# Patient Record
Sex: FEMALE
[De-identification: names, ages, dates, MRNs, and addresses within clinical notes are randomized per-mention and may not be internally consistent; named-entity substitution may affect disease eponyms.]

---

## 2019-04-16 ENCOUNTER — HOSPITAL ENCOUNTER (EMERGENCY)
Dept: HOSPITAL 25 - UCEAST | Age: 39
Discharge: HOME | End: 2019-04-16
Payer: SELF-PAY

## 2019-04-16 VITALS — DIASTOLIC BLOOD PRESSURE: 65 MMHG | SYSTOLIC BLOOD PRESSURE: 99 MMHG

## 2019-04-16 DIAGNOSIS — K59.00: Primary | ICD-10-CM

## 2019-04-16 PROCEDURE — G0463 HOSPITAL OUTPT CLINIC VISIT: HCPCS

## 2019-04-16 PROCEDURE — 74019 RADEX ABDOMEN 2 VIEWS: CPT

## 2019-04-16 PROCEDURE — 99202 OFFICE O/P NEW SF 15 MIN: CPT

## 2019-04-16 NOTE — UC
Abdominal Pain Female HPI





- HPI Summary


HPI Summary: 





39 yo female presents with constipation for roughly one year. She tells me that 

over the last year she has had issues with hard and small BMs and feeling 

bloated. She has a BM every 3-4 days, but never feels that she is satisfied 

with her bowel movements. Today she tells me that she has not had a BM in 5 

days. Last night her abdomen felt very bloating and uncomfortable so she took 

ducolax and did a fleet enema, but admits that the enema hurt so she is not 

sure how properly she administered this. She has never had any abdominal 

surgeries. No change in diet and is eating/drinking well. No blood in stool. 

Has never seen GI. 





- History of Current Complaint


Chief Complaint: UCGI


Stated Complaint: CONSTIPATION


Time Seen by Provider: 04/16/19 16:33


Hx Obtained From: Patient


Hx Last Menstrual Period: 4/10/2018


Onset/Duration: Gradual Onset


Severity Initially: Mild


Severity Currently: Mild


Pain Intensity: 3


Pain Scale Used: 0-10 Numeric


Allergies/Adverse Reactions: 


 Allergies











Allergy/AdvReac Type Severity Reaction Status Date / Time


 


No Known Allergies Allergy   Verified 04/16/19 16:17











Home Medications: 


 Home Medications





Irritable Bowel Homeopathic Ta 1 tab PO DAILY PRN 04/16/19 [History]


L.acidoph,Paracasei, B.lactis [Probiotic] 1 each PO DAILY 04/16/19 [History 

Confirmed 04/16/19]


Magnesium Hydroxide LIQ* [Milk of Magnesia LIQ*] 30 ml PO BID PRN 04/16/19 [

History Confirmed 04/16/19]


Sodium Phosphate ADULT ENEMA* [Fleet Enema*] 1 enema WA DAILY PRN 04/16/19 [

History Confirmed 04/16/19]











PMH/Surg Hx/FS Hx/Imm Hx





- Additional Past Medical History


Additional PMH: 





None





- Surgical History


Surgical History: None





- Family History


Known Family History: Positive: None





- Social History


Lives: With Family


Alcohol Use: None


Substance Use Type: None


Smoking Status (MU): Never Smoked Tobacco





Review of Systems


All Other Systems Reviewed And Are Negative: Yes


Constitutional: Positive: Negative


Skin: Positive: Negative


Respiratory: Positive: Negative


Cardiovascular: Positive: Negative


Gastrointestinal: Positive: Abdominal Pain, Other - Constipation


Genitourinary: Positive: Negative


Neurological: Positive: Negative


Psychological: Positive: Negative





Physical Exam





- Summary


Physical Exam Summary: 





 


GENERAL: NAD. WDWN. No pain distress.


SKIN: No rashes, sores, lesions, or open wounds.


NECK: Supple. Nontender. No lymphadenopathy. 


CHEST:  CTAB. No r/r/w. No accessory muscle use. Breathing comfortably and in 

no distress.


CV:  RRR. Without m/r/g. Pulses intact. Cap refill <2seconds


ABDOMEN:  Soft. NTTP. No distention or guarding. No CVA tenderness. Bowel 

sounds present


NEURO: Alert. 


PSYCH: Age appropriate behavior.


Triage Information Reviewed: Yes


Vital Signs: 


 Initial Vital Signs











Temp  98.4 F   04/16/19 16:20


 


Pulse  84   04/16/19 16:20


 


Resp  18   04/16/19 16:20


 


BP  99/65   04/16/19 16:20


 


Pulse Ox  99   04/16/19 16:20











Vital Signs Reviewed: Yes





Abd Pain Female Course/Dx





- Course


Course Of Treatment: 





Abdomen XR: 


IMPRESSION:


#. No abdominal pelvic pathologic process evident.





Suspect chronic constipation. Advised her to try a high fiber diet and will rx 

for miralax daily and, for now, will rx for mag citrate. Highly recommended f/u 

with GI for further evaluation/treatment.





- Differential Dx/Diagnosis


Provider Diagnosis: 


 Constipation








Discharge





- Sign-Out/Discharge


Documenting (check all that apply): Patient Departure


All imaging exams completed and their final reports reviewed: No Studies





- Discharge Plan


Condition: Stable


Disposition: HOME


Prescriptions: 


Magnesium CITRATE* [Citrate of Magnesia*] 300 ml PO ONCE #1 btl


Polyethylene Glycol 3350 [Miralax] 17 gm PO DAILY #30 powd.[email or web address]


Patient Education Materials:  Constipation (ED)


Referrals: 


Seven Cordon MD [Medical Doctor] - As Soon As Possible


No Primary Care Phys,NOPCP [Primary Care Provider] - 


Additional Instructions: 


If you develop a fever, shortness of breath, chest pain, new or worsening 

symptoms - please call your PCP or go to the ED.


 


I highly recommend you schedule an appointment with GI at the number below for 

further evaluation of your constipation.





Please take the miralax daily to help keep you from getting constipated.





- Billing Disposition and Condition


Condition: STABLE


Disposition: Home

## 2019-10-03 ENCOUNTER — HOSPITAL ENCOUNTER (EMERGENCY)
Dept: HOSPITAL 25 - ED | Age: 39
LOS: 1 days | Discharge: HOME | End: 2019-10-04
Payer: SELF-PAY

## 2019-10-03 DIAGNOSIS — R05: ICD-10-CM

## 2019-10-03 DIAGNOSIS — R06.02: ICD-10-CM

## 2019-10-03 DIAGNOSIS — R42: ICD-10-CM

## 2019-10-03 DIAGNOSIS — R20.2: ICD-10-CM

## 2019-10-03 DIAGNOSIS — R10.9: ICD-10-CM

## 2019-10-03 DIAGNOSIS — R07.89: ICD-10-CM

## 2019-10-03 DIAGNOSIS — R51: Primary | ICD-10-CM

## 2019-10-03 LAB
BASOPHILS # BLD AUTO: 0.1 10^3/UL (ref 0–0.2)
EOSINOPHIL # BLD AUTO: 0.2 10^3/UL (ref 0–0.6)
HCT VFR BLD AUTO: 38 % (ref 35–47)
HGB BLD-MCNC: 12.9 G/DL (ref 12–16)
LYMPHOCYTES # BLD AUTO: 1.5 10^3/UL (ref 1–4.8)
MCH RBC QN AUTO: 32 PG (ref 27–31)
MCHC RBC AUTO-ENTMCNC: 34 G/DL (ref 31–36)
MCV RBC AUTO: 94 FL (ref 80–97)
MONOCYTES # BLD AUTO: 1.1 10^3/UL (ref 0–0.8)
NEUTROPHILS # BLD AUTO: 11.4 10^3/UL (ref 1.5–7.7)
NRBC # BLD AUTO: 0 10^3/UL
NRBC BLD QL AUTO: 0
PLATELET # BLD AUTO: 794 10^3/UL (ref 150–450)
RBC # BLD AUTO: 4 10^6 /UL (ref 3.7–4.87)
WBC # BLD AUTO: 14.3 10^3/UL (ref 3.5–10.8)

## 2019-10-03 PROCEDURE — 99282 EMERGENCY DEPT VISIT SF MDM: CPT

## 2019-10-03 PROCEDURE — 85025 COMPLETE CBC W/AUTO DIFF WBC: CPT

## 2019-10-03 PROCEDURE — 84443 ASSAY THYROID STIM HORMONE: CPT

## 2019-10-03 PROCEDURE — 80053 COMPREHEN METABOLIC PANEL: CPT

## 2019-10-03 PROCEDURE — 96372 THER/PROPH/DIAG INJ SC/IM: CPT

## 2019-10-03 PROCEDURE — 71046 X-RAY EXAM CHEST 2 VIEWS: CPT

## 2019-10-03 PROCEDURE — 84484 ASSAY OF TROPONIN QUANT: CPT

## 2019-10-03 PROCEDURE — 83735 ASSAY OF MAGNESIUM: CPT

## 2019-10-03 PROCEDURE — 36415 COLL VENOUS BLD VENIPUNCTURE: CPT

## 2019-10-03 PROCEDURE — 83605 ASSAY OF LACTIC ACID: CPT

## 2019-10-03 PROCEDURE — 85379 FIBRIN DEGRADATION QUANT: CPT

## 2019-10-03 PROCEDURE — 93005 ELECTROCARDIOGRAM TRACING: CPT

## 2019-10-03 PROCEDURE — 84702 CHORIONIC GONADOTROPIN TEST: CPT

## 2019-10-03 PROCEDURE — 70450 CT HEAD/BRAIN W/O DYE: CPT

## 2019-10-03 NOTE — ED
Complex/Multi-Sys Presentation





- HPI Summary


HPI Summary: 


38 year old female presents with headache today.  She states she was eating 

dinner when she developed a severe headache in the posterior aspect of her 

head.  States the pain is dissipating.  It was 10 out of 10 pain.  States it 

only happened about an hour ago.  She denies any nausea vomiting.  States her 

visual blurry for a couple seconds.  She admits to dizziness.  She also admits 

to chest pain and shortness breath.  She just had a cough.  She also had 

abdominal pain.  No urinary symptoms.  She states she felt very anxious.  She 

felt numbness and tingling across body.  No weakness.  Was ambulating to try to 

deal with the shortness of breath and said it helped.  Currently headache is 

only 6 out of 10.  Has some slight shortness of breath. She has no medical 

conditions.  No family history cardiac disease.  Is nonsmoker.  








- History Of Current Complaint


Chief Complaint: EDDizziness


Time Seen by Provider: 10/03/19 23:15





- Allergies/Home Medications


Allergies/Adverse Reactions: 


 Allergies











Allergy/AdvReac Type Severity Reaction Status Date / Time


 


No Known Allergies Allergy   Verified 10/03/19 22:39











Home Medications: 


 Home Medications





NK [No Home Medications Reported]  10/04/19 [History Confirmed 10/04/19]











PMH/Surg Hx/FS Hx/Imm Hx


Endocrine/Hematology History: 


   Denies: Hx Anticoagulant Therapy


Respiratory History: 


   Denies: Hx Asthma


Infectious Disease History: No


Infectious Disease History: 


   Denies: Traveled Outside the [place] in Last 30 Days





- Family History


Known Family History: Positive: None


   Negative: Cardiac Disease





- Social History


Alcohol Use: Occasionally


Substance Use Type: Reports: None


Smoking Status (MU): Never Smoked Tobacco





Review of Systems


Negative: Fever


Positive: Chest Pain


Positive: Shortness Of Breath, Cough


Negative: Vomiting, Nausea


Positive: Headache


All Other Systems Reviewed And Are Negative: Yes





Physical Exam


Triage Information Reviewed: Yes


Vital Signs On Initial Exam: 


 Initial Vitals











Temp Pulse Resp BP Pulse Ox


 


 97.6 F   93   18   145/99   100 


 


 10/03/19 22:32  10/03/19 22:32  10/03/19 22:32  10/03/19 22:32  10/03/19 22:32











Vital Signs Reviewed: Yes


Appearance: Positive: Well-Appearing


Skin: Positive: Warm, Dry


Head/Face: Positive: Normal Head/Face Inspection


Eyes: Positive: Normal, EOMI, ZAIDA, Conjunctiva Clear


ENT: Positive: Normal ENT inspection, Pharynx normal, TMs normal


Respiratory/Lung Sounds: Positive: Clear to Auscultation, Breath Sounds Present


Cardiovascular: Positive: Normal, RRR


Abdomen Description: Positive: Nontender, Soft


Bowel Sounds: Positive: Present


Musculoskeletal: Positive: Normal


Neurological: Positive: Sensory/Motor Intact, Alert, Oriented to Person Place, 

Time, CN Intact II-III, Finger to Nose


Psychiatric: Positive: Normal





Procedures





- Sedation


Patient Received Moderate/Deep Sedation with Procedure: No





Diagnostics





- Vital Signs


 Vital Signs











  Temp Pulse Resp BP Pulse Ox


 


 10/03/19 22:32  97.6 F  93  18  145/99  100














- Laboratory


Result Diagrams: 


 10/03/19 23:37





 10/03/19 23:37


Lab Statement: Any lab studies that have been ordered have been reviewed, and 

results considered in the medical decision making process.





- Radiology


  ** chest


Radiology Interpretation Completed By: ED Physician


Summary of Radiographic Findings: no active disease





- CT


  ** brain


CT Interpretation Completed By: Radiologist


Summary of CT Findings: IMPRESSION: No acute intracranial abnormality.





- EKG


  ** No standard instances


Cardiac Rate: NL


EKG Rhythm: Sinus Rhythm


Summary of EKG Findings: sinus rhythm





Re-Evaluation





- Re-Evaluation


  ** First Eval


Re-Evaluation Time: 01:19


Change: Improved


Comment: feeling better





Complex Multi-Symp Course/Dx


Course Of Treatment: 38 year old female presents with headache today.  She 

states she was eating dinner when she developed a severe headache in the 

posterior aspect of her head.  States the pain is dissipating.  It was 10 out 

of 10 pain.  States it only happened about an hour ago.  She denies any nausea 

vomiting.  States her visual blurry for a couple seconds.  She admits to 

dizziness.  She also admits to chest pain and shortness breath.  She just had a 

cough.  She also had abdominal pain.  No urinary symptoms.  She states she felt 

very anxious.  She felt numbness and tingling across body.  No weakness.  Was 

ambulating to try to deal with the shortness of breath and said it helped.  

Currently headache is only 6 out of 10.  Has some slight shortness of breath. 

She has no medical conditions.  No family history cardiac disease.  Is 

nonsmoker.  On exam lungs clear to auscultation.  Normal neuro exam.  with 

sudden onset of a headache in the last hour will get CT.  CT brain shows no 

acute findings. as headache is less than 6 hours CT is highly senstive. ekg 

sinus rhythm. wbc 14. troponin zero. d-dimer neg. wbc 14.  chest xray normal. 

gave toradol for headache and patient feeling better. sob could be related to 

anxiety with the headache. will have est care with primary. patient understand 

and agrees with plan.





- Diagnoses


Differential Diagnoses/HQI/PQRI: CVA, Metabolic Abnormality, Urinary Tract 

Infection


Provider Diagnoses: 


 Headache, Shortness of breath








Discharge ED





- Sign-Out/Discharge


Documenting (check all that apply): Patient Departure





- Discharge Plan


Condition: Good


Disposition: HOME


Patient Education Materials:  Acute Headache (ED)


Referrals: 


[facility]ERR[facility] [Outside]


Additional Instructions: 


take tyenlol or ibuprofen every 6 hours as needed for pain


establish care with primary


Return to ED if develop any new or worsening symptoms





- Billing Disposition and Condition


Condition: GOOD


Disposition: Home

## 2019-10-04 VITALS — SYSTOLIC BLOOD PRESSURE: 110 MMHG | DIASTOLIC BLOOD PRESSURE: 63 MMHG

## 2019-10-04 LAB
ALBUMIN SERPL BCG-MCNC: 4.2 G/DL (ref 3.2–5.2)
ALBUMIN/GLOB SERPL: 1.6 {RATIO} (ref 1–3)
ALP SERPL-CCNC: 47 U/L (ref 34–104)
ALT SERPL W P-5'-P-CCNC: 22 U/L (ref 7–52)
ANION GAP SERPL CALC-SCNC: 7 MMOL/L (ref 2–11)
AST SERPL-CCNC: 41 U/L (ref 13–39)
BUN SERPL-MCNC: 19 MG/DL (ref 6–24)
BUN/CREAT SERPL: 25.7 (ref 8–20)
CALCIUM SERPL-MCNC: 9 MG/DL (ref 8.6–10.3)
CHLORIDE SERPL-SCNC: 105 MMOL/L (ref 101–111)
GLOBULIN SER CALC-MCNC: 2.7 G/DL (ref 2–4)
GLUCOSE SERPL-MCNC: 103 MG/DL (ref 70–100)
HCG SERPL QL: 2.22 MIU/ML
HCO3 SERPL-SCNC: 26 MMOL/L (ref 22–32)
MAGNESIUM SERPL-MCNC: 2 MG/DL (ref 1.9–2.7)
POTASSIUM SERPL-SCNC: 3.5 MMOL/L (ref 3.5–5)
PROT SERPL-MCNC: 6.9 G/DL (ref 6.4–8.9)
SODIUM SERPL-SCNC: 138 MMOL/L (ref 135–145)
TROPONIN I SERPL-MCNC: 0.01 NG/ML (ref ?–0.04)
TSH SERPL-ACNC: 2.73 MCIU/ML (ref 0.34–5.6)

## 2020-03-25 ENCOUNTER — HOSPITAL ENCOUNTER (EMERGENCY)
Dept: HOSPITAL 25 - UCEAST | Age: 40
Discharge: HOME | End: 2020-03-25
Payer: COMMERCIAL

## 2020-03-25 DIAGNOSIS — R06.02: ICD-10-CM

## 2020-03-25 DIAGNOSIS — Z20.828: ICD-10-CM

## 2020-03-25 DIAGNOSIS — R51: ICD-10-CM

## 2020-03-25 DIAGNOSIS — M79.10: ICD-10-CM

## 2020-03-25 DIAGNOSIS — R05: ICD-10-CM

## 2020-03-25 DIAGNOSIS — J06.9: Primary | ICD-10-CM

## 2020-03-25 PROCEDURE — 87651 STREP A DNA AMP PROBE: CPT

## 2020-03-25 PROCEDURE — G0463 HOSPITAL OUTPT CLINIC VISIT: HCPCS

## 2020-03-25 PROCEDURE — 87635 SARS-COV-2 COVID-19 AMP PRB: CPT

## 2020-03-25 PROCEDURE — 99212 OFFICE O/P EST SF 10 MIN: CPT

## 2020-03-25 NOTE — XMS REPORT
Continuity of Care Document (CCD)

 Created on:2020



Patient:Laura Venegas

Sex:Female

:1980

External Reference #:[id number]





Demographics







 Address  [address]



   [address] [place] [address]

 

 Mobile Phone  [phone]

 

 Preferred Language  en

 

 Marital Status   or 

 

 Gnosticism Affiliation  Unknown

 

 Race  White

 

 Ethnic Group   or 









Author







 Name  Juvenal Boswell MD (transmitted by agent of provider Cary Smith)

 

 Address  [address]



   [place] [address]









Care Team Providers







 Name  Role  Phone

 

 Juvenal Boswell MD - Student in an  Care Team Information   [phone]
[phone]



 Organized Health Care    



 Education/Training Program    









Problems







 Description

 

 No Information Available







Social History







 Type  Date  Description  Comments

 

 Birth Sex    Unknown  

 

 ETOH Use    Occasionally consumes  



     alcohol  

 

 Tobacco Use  Start: Unknown  Patient has never  



     smoked  

 

 Recreational Drug Use    Denies Drug Use  

 

 Smoking Status  Reviewed: 20  Patient has never  



     smoked  

 

 Exercise Type/Frequency    Exercises regularly  Cardio, swimming.







Allergies, Adverse Reactions, Alerts







 Description

 

 No Known Drug Allergies







Medications







 Active Medications  SIG  Qnty  Indications  Ordering Provider  Date

 

 Seroquel  take one tablet  30tabs  F31.0  Juvenal Boswell,  2020



        50mg Tablets  at bedtime      MD  



           

 

 Peg-3350/Electrolytes  take 17 g(1 table  4000ml  K59.00  Juvenal Boswell,  2020



   spoon) with 8      MD  



 236gm Solution Rec  ounce of water        



   daily        







Medications Administered in Office







 Medication  SIG  Qnty  Indications  Ordering Provider  Date

 

 PPD                      Injection        Román Harris MD  2019



           







Immunizations







 Description

 

 No Information Available







Vital Signs







 Date  Vital  Result  Comment

 

 2020 12:53pm  Height  63 inches  5'3"









 Weight  153.00 lb  

 

 Heart Rate  71 /min  

 

 BP Systolic Sitting  110 mmHg  

 

 BP Diastolic Sitting  72 mmHg  

 

 Body Temperature  99.3 F  

 

 O2 % BldC Oximetry  99 %  

 

 BMI (Body Mass Index)  27.1 kg/m2  







Results







 Description

 

 No Information Available







Procedures







 Description

 

 No Information Available







Medical Devices







 Description

 

 No Information Available







Encounters







 Description

 

 No Information Available







Assessments







 Date  Code  Description  Provider

 

 2020  F31.0  Bipolar disorder, current episode hypomanic  Juvenal Boswell MD

 

 2020  K59.00  Constipation, unspecified  Juvenal Boswell MD

 

 2020  N92.1  Excessive and frequent menstruation with  Juvenal Boswell MD



     irregular cycle  







Plan of Treatment

Future Appointment(s):2020  2:00 pm - Juvenal Boswell MD at [facility] 
[facility] - [id number] - Juvenal Boswell MDF31.0 Bipolar disorder, 
current episode hypomanicNew Medication:Seroquel 50 mg - take one tablet at 
bedtimeComments:1. Take seroquel 50 mg at bedtime every day.2. If you do not 
feel good or have thoughts of harming yurself or others then please do call 
us.Referral:Mic Drummond MD, SpisjtcgotF10.00 Constipation, unspecifiedNew 
Medication:Peg-3350/Electrolytes 236 gm - take 17 g(1 table spoon) with 8 ounce 
of water dailyNew Xrays:US Abdomen Complete, Scheduled: 20Comments:1. 
Take medication as prescribed.2. Take more fiber rich diet.3. Exercise 
regualrly and drink more fluids.N92.1 Excessive and frequent menstruation with 
irregular cycleComments:1. Please do blood and urine test today.Follow up:JULIA 
previous records from [facility] month for physical examination.



Functional Status







 Description

 

 No Information Available







Mental Status







 Description

 

 No Information Available







Referrals







 Refer to   Reason for Referral  Status  Appt Date

 

 Mic Drummond MD    Created  









 [address] [place] [address]

 

 [phone]

## 2020-03-25 NOTE — UC
[facility] HPI


HPI Summary: 


39-year-old woman has a chief complaint of influenza-like symptoms and upper 

respiratory type symptoms for 3-4 days.  She's had myalgias and she reports her 

son had a cough and was diagnosed with influenza.  She's had rhinorrhea and 

chills.  No measured fevers. Has an intermittent headache.  She does have a 

cough she feels short of breath with activity but no shortness of breath at 

rest.  We discussed the options of telemedicine versus coming into the clinic 

and the patient reports she would like to have a telemedicine visit.





 [facility]


Previously Healthy: Yes


Endocrine/Hematology History: 


   Denies: Hx Anticoagulant Therapy


Respiratory History: 


   Denies: Hx Asthma





- Family History


Known Family History: Positive: None


   Negative: Cardiac Disease





- Social History


Alcohol Use: Occasionally


Substance Use Type: Reports: None


Smoking Status (MU): Never Smoked Tobacco





[facility] ROS


All Other Systems Reviewed And Are Negative: Yes


Positive: Other - see hpi


Eyes: Negative


Positive: Nasal Discharge


Cardiovascular: Negative


Positive: Shortness Of Breath, Cough


Positive: no symptoms reported


Positive: Myalgia


Positive: Headache


Psychological: Normal





[facility] PE


Telehealth Physical Exam: 


To decrease the risk of transmission of Covid 19, Examination and interview was 

performed by visual zone telemedicine while the patient was parked in the 

clinic parking lot.  This limits the physical examination.


Appearance: Positive: Alert and Oriented, No Pain Distress, Ill-Appearing - 

patient appears in no acute distress.  She does have a cough.


Skin: Positive: Skin Color Reflects Adequate Perfusion


Respiratory/Lung Sounds: Positive: Cough


Cardiovascular: Positive: Skin Color Reflects Adequate Perfusion


Musculoskeletal: Positive: Normal Tone


Neurological: Positive: Other - patient's alert in no acute distress


Psychiatric: Positive: Normal





[facility] Course/Dx


Assessment/Plan: 


Strep and flu were negative.  Covid 19 nasal swab sampled.  Patient will going 

to self isolation.  Follow-up with the [facility] Department.  She 

will treat symptomatically.  I discussed with her to not take ibuprofen and use 

acetaminophen instead as needed.  Also discussed with the patient that if she 

gets worse she should get reevaluated.


Provider Diagnoses: 


 Influenza-like illness, Upper respiratory infection








[facility] Disposition


Provider Recommendation for Treatment: Primary Care Physician


Telehealth Visit: Patient Consented Verbally to Telehealth Visit


Telehealth Patient Statement: The patient should understand that they are 

communicating with their provider via a secure communication platform and that 

all the same privacy and confidentiality rules apply. They will also be 

responsible for copayments or coinsurances that apply to any Telehealth visit.


Patient Identifiers: 2 Patient Identifiers Verified for Telehealth Visit


Telehealth Visit Start Time: 15:35


Telehealth Visit End Time: 16:25


Telehealth Provider Attestation: The above services were appropriate to provide 

in a Telehealth setting.